# Patient Record
Sex: MALE | Race: BLACK OR AFRICAN AMERICAN | NOT HISPANIC OR LATINO | Employment: OTHER | URBAN - METROPOLITAN AREA
[De-identification: names, ages, dates, MRNs, and addresses within clinical notes are randomized per-mention and may not be internally consistent; named-entity substitution may affect disease eponyms.]

---

## 2017-01-06 ENCOUNTER — PATIENT MESSAGE (OUTPATIENT)
Dept: PULMONOLOGY | Facility: CLINIC | Age: 72
End: 2017-01-06

## 2017-01-09 ENCOUNTER — TELEPHONE (OUTPATIENT)
Dept: PULMONOLOGY | Facility: CLINIC | Age: 72
End: 2017-01-09

## 2017-01-09 DIAGNOSIS — J44.9 CHRONIC OBSTRUCTIVE PULMONARY DISEASE, UNSPECIFIED COPD TYPE: Primary | ICD-10-CM

## 2017-01-09 NOTE — TELEPHONE ENCOUNTER
----- Message from Deanna Viramontes sent at 1/6/2017  9:50 AM CST -----  Nellie with [employer] at [phone]////pt is requesting a nebulizer kit//is needing a medical necessary form//along with clinical notes and prescription//fax is [phone]//please call if any questions//kiara/melodie

## 2017-01-23 ENCOUNTER — TELEPHONE (OUTPATIENT)
Dept: PULMONOLOGY | Facility: CLINIC | Age: 72
End: 2017-01-23

## 2017-01-23 NOTE — TELEPHONE ENCOUNTER
Hospital follow up scheduled after antibiotics are completed for pneumonia. Wife agrees with date and time.

## 2017-01-23 NOTE — TELEPHONE ENCOUNTER
----- Message from Richard Hair sent at 1/23/2017  8:35 AM CST -----  Contact: pt's spouse  She's calling in regards to the pt being hospitalized from 1/19/17 to 1/22/17, pt would like to be worked into the dr's schedule for a hospital f/u, please advise, [phone] (home) [phone] (work)

## 2017-02-04 RX ORDER — ALBUTEROL SULFATE 90 UG/1
AEROSOL, METERED RESPIRATORY (INHALATION)
Qty: 8.5 INHALER | Refills: 11 | Status: SHIPPED | OUTPATIENT
Start: 2017-02-04

## 2017-02-08 ENCOUNTER — OFFICE VISIT (OUTPATIENT)
Dept: PULMONOLOGY | Facility: CLINIC | Age: 72
End: 2017-02-08
Payer: MEDICARE

## 2017-02-08 VITALS
HEART RATE: 108 BPM | DIASTOLIC BLOOD PRESSURE: 60 MMHG | BODY MASS INDEX: 23.57 KG/M2 | HEIGHT: 74 IN | SYSTOLIC BLOOD PRESSURE: 110 MMHG | OXYGEN SATURATION: 90 % | RESPIRATION RATE: 20 BRPM | WEIGHT: 183.63 LBS

## 2017-02-08 DIAGNOSIS — J96.11 CHRONIC RESPIRATORY FAILURE WITH HYPOXIA: Chronic | ICD-10-CM

## 2017-02-08 DIAGNOSIS — J61 ASBESTOSIS: Chronic | ICD-10-CM

## 2017-02-08 DIAGNOSIS — I27.20 PULMONARY HYPERTENSION: Primary | Chronic | ICD-10-CM

## 2017-02-08 DIAGNOSIS — J44.9 COPD, VERY SEVERE: Chronic | ICD-10-CM

## 2017-02-08 DIAGNOSIS — I50.810 RIGHT HEART FAILURE, NYHA CLASS 2: Chronic | ICD-10-CM

## 2017-02-08 PROCEDURE — 1160F RVW MEDS BY RX/DR IN RCRD: CPT | Mod: S$GLB,,, | Performed by: INTERNAL MEDICINE

## 2017-02-08 PROCEDURE — 99999 PR PBB SHADOW E&M-EST. PATIENT-LVL III: CPT | Mod: PBBFAC,,, | Performed by: INTERNAL MEDICINE

## 2017-02-08 PROCEDURE — 99215 OFFICE O/P EST HI 40 MIN: CPT | Mod: 25,S$GLB,, | Performed by: INTERNAL MEDICINE

## 2017-02-08 PROCEDURE — 1159F MED LIST DOCD IN RCRD: CPT | Mod: S$GLB,,, | Performed by: INTERNAL MEDICINE

## 2017-02-08 PROCEDURE — 1157F ADVNC CARE PLAN IN RCRD: CPT | Mod: S$GLB,,, | Performed by: INTERNAL MEDICINE

## 2017-02-08 PROCEDURE — 96372 THER/PROPH/DIAG INJ SC/IM: CPT | Mod: S$GLB,,, | Performed by: INTERNAL MEDICINE

## 2017-02-08 RX ORDER — AMBRISENTAN 5 MG/1
10 TABLET, FILM COATED ORAL DAILY
Qty: 60 TABLET | Refills: 11 | Status: SHIPPED | OUTPATIENT
Start: 2017-02-08

## 2017-02-08 RX ORDER — DIGOXIN 125 MCG
125 TABLET ORAL DAILY
Qty: 30 TABLET | Refills: 11 | Status: SHIPPED | OUTPATIENT
Start: 2017-02-08 | End: 2018-02-08

## 2017-02-08 RX ORDER — PREDNISONE 10 MG/1
10 TABLET ORAL DAILY
Qty: 60 TABLET | Refills: 0 | Status: SHIPPED | OUTPATIENT
Start: 2017-02-08 | End: 2017-02-18

## 2017-02-08 RX ORDER — TRIAMCINOLONE ACETONIDE 40 MG/ML
80 INJECTION, SUSPENSION INTRA-ARTICULAR; INTRAMUSCULAR
Status: COMPLETED | OUTPATIENT
Start: 2017-02-08 | End: 2017-02-08

## 2017-02-08 RX ORDER — CARVEDILOL 12.5 MG/1
12.5 TABLET ORAL 2 TIMES DAILY
Qty: 60 TABLET | Refills: 11 | Status: SHIPPED | OUTPATIENT
Start: 2017-02-08

## 2017-02-08 RX ADMIN — TRIAMCINOLONE ACETONIDE 80 MG: 40 INJECTION, SUSPENSION INTRA-ARTICULAR; INTRAMUSCULAR at 09:02

## 2017-02-08 NOTE — PATIENT INSTRUCTIONS
Taking Digoxin  Digoxin helps slow your heartbeat. It also helps your heart beat stronger. Doctors usually use this treatment in heart failure or in atrial fibrillation. There may be other reasons your doctor prescribes digoxin. Take your medication as your doctor directs. Use the tips below as reminders.  Guidelines for taking digoxin  · Take digoxin once a day. Try to take it at the same time each day.  · Check your pulse before you take your digoxin. If your pulse is under 60 beats per minute, wait 5 minutes. Then check your pulse again. If its still under 60, call your healthcare provider. If your pulse is normal, take your digoxin.  · Do not stop taking digoxin unless your healthcare provider tells you to. This could cause serious problems.  · Review all prescription medications, including over-the-counter medications, vitamins, and supplements with your doctor to ensure they do not interact with digoxin.  · If you have a history of kidney problems, you may not be a good candidate for digoxin. Be sure to discuss this with your doctor.  If you miss a dose  · If no more than 12 hours have passed from the time you usually take your digoxin, take it as soon as you remember. The next day, take it at the usual time.  · If more than 12 hours have passed from the time you usually take your digoxin, dont take that dose. The next day, take it at the usual time.  · Never take more than one dose of digoxin at a time.  How to take your pulse    · Place your first two fingers on the inside of your wrist, below the base of the thumb. Press lightly.  · Count the number of beats for 1 full minute. A normal pulse is between 60 and 100 beats per minute.  When to call your healthcare provider  Call your healthcare provider before you take your next dose of digoxin if you have any of these symptoms:  · Nausea, vomiting, diarrhea, or loss of appetite  · If you see light or halos around objects  · Blurred or yellowed  vision  · Extreme fatigue  · If your pulse becomes fast, irregular, or a pulse below 60 beats per minute  · Chest pain, or pain that goes to the shoulder, neck, or jaw  · Bloody or black stools  · Drowsiness or confusion  · Trouble breathing  · Blurred or double vision, or you see green or yellow halos around lights  · Swelling in your feet or ankles  · Sudden weight gain  Date Last Reviewed: 6/1/2016  © 6690-9248 [employer]. [address] [place] [address]. All rights reserved. This information is not intended as a substitute for professional medical care. Always follow your healthcare professional's instructions.

## 2017-02-08 NOTE — PROGRESS NOTES
Subjective:      Patient ID: Joaquin Crowley is a 71 y.o. male.    Patient Active Problem List   Diagnosis    Asbestosis    Silicosis    Chronic respiratory failure with hypoxia    Pulmonary hypertension    Esophageal reflux    Interatrial cardiac shunt    Right heart failure, NYHA class 2    COPD, very severe    Inadequate sleep hygiene     Problem list has been reviewed.    Chief Complaint: Hospital Follow Up    HPI  He was admitted at [facility] for Pneumonia 2 weeks ago. He was there for 4 days. He was treated with IV antibiotics. He reports that he has not really improved since discharge. He reports that he is weak and has progressive dyspnea with exertion. He ha been back to the ED at least once every weeks. He reports that on subsequent visits to the ED he is given nebulized bronchodilators, ZPAK and steroids and discharged home. His CAT score today is 32. His current respiratory therapy regimen is ACCUNEB, PROAIIR, SYMBICORT and SPIRIVA  which provides some relief. He is adherent with his regimen.        Previous Report Reviewed: office notes     The following portions of the patient's history were reviewed and updated as appropriate: He  has a past medical history of Asbestosis(501); Asthma; Bronchitis chronic; COPD (chronic obstructive pulmonary disease); Coronary artery disease; Dyspnea; Hypertension; Lung disease; Pulmonary HTN; and Silicosis.  He  has a past surgical history that includes Appendectomy; Cholecystectomy; Cardiac catheterization; and Coronary artery bypass graft.  His family history includes Diabetes in his mother; Hypertension in his father.  He  reports that he has never smoked. He does not have any smokeless tobacco history on file. He reports that he does not drink alcohol or use illicit drugs.  He has a current medication list which includes the following prescription(s): carvedilol, albuterol, ambrisentan, amlodipine, aspirin, bumetanide, digoxin, finasteride, latanoprost, nexium,  "prednisone, proair hfa, saw palmetto, solifenacin, and umeclidinium-vilanterol, and the following Facility-Administered Medications: triamcinolone acetonide.  He is allergic to iodine and iodide containing products..    Review of Systems   Constitutional: Positive for night sweats. Negative for chills and fatigue.   HENT: Positive for rhinorrhea and congestion. Negative for nosebleeds, sore throat and hearing loss.    Eyes: Negative for itching.   Respiratory: Positive for cough, chest tightness, wheezing and dyspnea on extertion. Negative for apnea and snoring.    Cardiovascular: Positive for leg swelling. Negative for chest pain and palpitations.   Genitourinary: Negative for difficulty urinating.   Endocrine: Negative for cold intolerance and heat intolerance.    Musculoskeletal: Negative for arthralgias and back pain.   Gastrointestinal: Negative for abdominal pain and acid reflux.   Neurological: Positive for headaches. Negative for dizziness.   Hematological: No excessive bruising.   Psychiatric/Behavioral: Negative for confusion. The patient is not nervous/anxious.       Objective:     Visit Vitals    /60    Pulse 108    Resp 20    Ht 6' 2" (1.88 m)    Wt 83.3 kg (183 lb 10.3 oz)    SpO2 (!) 90%    BMI 23.58 kg/m2     Body mass index is 23.58 kg/(m^2).    Physical Exam   Constitutional: He is oriented to person, place, and time. He appears well-developed and well-nourished.   HENT:   Head: Normocephalic and atraumatic.   Eyes: EOM are normal. Pupils are equal, round, and reactive to light.   Neck: Normal range of motion. Neck supple.   Cardiovascular: Normal rate and regular rhythm.    Pulmonary/Chest: Effort normal. He has decreased breath sounds in the right middle field, the right lower field, the left middle field and the left lower field.   Abdominal: Soft. Bowel sounds are normal.   Musculoskeletal: Normal range of motion.   Neurological: He is alert and oriented to person, place, and time. "   Skin: Skin is warm and dry.   Psychiatric: He has a normal mood and affect. His behavior is normal.   Nursing note and vitals reviewed.      Personal Diagnostic Review  none pertinent    Assessment:     1. Pulmonary hypertension Active   2. Right heart failure, NYHA class 2 Active   3. COPD, very severe Active   4. Chronic respiratory failure with hypoxia Stable   5. Asbestosis Stable     Outpatient Encounter Prescriptions as of 2/8/2017   Medication Sig Dispense Refill    carvedilol (COREG) 12.5 MG tablet Take 1 tablet (12.5 mg total) by mouth 2 (two) times daily. 60 tablet 11    [DISCONTINUED] carvedilol (COREG) 12.5 MG tablet Take 12.5 mg by mouth 2 (two) times daily.      albuterol (ACCUNEB) 0.63 mg/3 mL Nebu Take 3 mLs (0.63 mg total) by nebulization every 6 (six) hours as needed. 9 mL 3    ambrisentan (LETAIRIS) 5 MG Tab Take 2 tablets (10 mg total) by mouth once daily. 60 tablet 11    amlodipine (NORVASC) 5 MG tablet Take 5 mg by mouth once daily.      aspirin (ECOTRIN) 325 MG EC tablet Take 325 mg by mouth once daily.      bumetanide (BUMEX) 1 MG tablet Take 2 tablets (2 mg total) by mouth once daily. 30 tablet 11    digoxin (LANOXIN) 125 mcg tablet Take 1 tablet (125 mcg total) by mouth once daily. 30 tablet 11    finasteride (PROSCAR) 5 mg tablet Take 5 mg by mouth once daily.      latanoprost 0.005 % ophthalmic solution       NEXIUM 40 mg capsule TAKE 1 CAPSULE BY MOUTH EVERY DAY BEFORE BREAKFAST 30 capsule 11    predniSONE (DELTASONE) 10 MG tablet Take 1 tablet (10 mg total) by mouth once daily. 40 mg PO QD X 3 days then 30 mg PO QD x 3 days then 20 mg PO QD X 3 days then 10 mg PO QD X 3 days then 5 mg PO QD x 3 days then stop. 60 tablet 0    PROAIR HFA 90 mcg/actuation inhaler INHALE 2 PUFFS BY MOUTH EVERY 4 HOURS AS NEEDED FOR WHEEZING 8.5 Inhaler 11    saw palmetto 80 MG capsule Take 80 mg by mouth 2 (two) times daily.      solifenacin (VESICARE) 5 MG tablet Take 10 mg by mouth once  "daily.      umeclidinium-vilanterol (ANORO ELLIPTA) 62.5-25 mcg/actuation DsDv Inhale 1 puff into the lungs once daily. 30 each 11    [DISCONTINUED] budesonide-formoterol 160-4.5 mcg (SYMBICORT) 160-4.5 mcg/actuation HFAA Inhale 2 puffs into the lungs every 12 (twelve) hours. 10.2 g 11    [DISCONTINUED] doxycycline (VIBRAMYCIN) 100 MG Cap   0    [DISCONTINUED] LETAIRIS 5 mg Tab TAKE 1 TABLET (5 MG) ORALLY DAILY. DO NOT HANDLE IF PREGNANT. 30 tablet 11    [DISCONTINUED] levoFLOXacin (LEVAQUIN) 250 MG tablet TK 1 T PO QD FOR 5 DAYS  0    [DISCONTINUED] tadalafil (CIALIS) 10 MG tablet Take 2 tablets (20 mg total) by mouth once daily. 60 tablet 11    [DISCONTINUED] tiotropium (SPIRIVA WITH HANDIHALER) 18 mcg inhalation capsule Inhale 1 capsule (18 mcg total) into the lungs once daily. 30 capsule 11     Facility-Administered Encounter Medications as of 2/8/2017   Medication Dose Route Frequency Provider Last Rate Last Dose    triamcinolone acetonide injection 80 mg  80 mg Intramuscular 1 time in Clinic/HOD Arcadio Grey MD         Orders Placed This Encounter   Procedures    NEBULIZER FOR HOME USE     Order Specific Question:   Height:     Answer:   6' 2" (1.88 m)     Order Specific Question:   Weight:     Answer:   83.3 kg (183 lb 10.3 oz)     Order Specific Question:   Length of need (1-99 months):     Answer:   99    CT Chest With Contrast     Standing Status:   Future     Standing Expiration Date:   2/8/2018     Order Specific Question:   Reason for Exam:     Answer:   Asbestosis     Order Specific Question:   Is the patient allergic to iodine or contrast? Has a steroid / antihistamine prep been administered?     Answer:   No     Order Specific Question:   Is the patient on ANY Metformin drug such as Glugophage/Glucovance?           Should be off drug 48 hours after contrast. Check renal function before restart.     Answer:   No     Order Specific Question:   Age > 60 years?     Answer:   Yes     Order " Specific Question:   History of Kidney Disease - including: decreased kidney function, dialysis, kidney transplay, single kidney, kidney cancer, kidney surgery?     Answer:   None     Order Specific Question:   Does the patient have high blood preasure requiring medical treatment?     Answer:   No     Order Specific Question:   Diabetes?     Answer:   No     Order Specific Question:   May the Radiologist modify the order per protocol to meet the clinical needs of the patient?     Answer:   Yes     Order Specific Question:   Recist criteria?     Answer:   No     Order Specific Question:   Will this service be billed to a Worker's Comp policy?     Answer:   No     Plan:     Discussed diagnosis, its evaluation, treatment and usual course. All questions answered.     Chronic respiratory failure with hypoxia  Continue oxygen supplementation at 4 - 5 L /min continuous    COPD, very severe  COPD ROS: taking medications as instructed, no medication side effects noted, notes increased dyspnea, uses oxygen at night, uses oxygen throughout the day, no swelling of ankles, no chest pain.   New concerns: Notes increasing dyspnae with exertion.  Exam: chest clear to auscultation, no wheezes, rales or rhonchi, symmetric air entry, decreased air entry noted diffusely.   Assessment:  COPD loss of control due to intercurrent illness.   Plan: Substitute SPIRIVA and SYMBICORT with ANORO. Kenalog 80mg IM X 1. Prednisone taper. NEBULIZER for HOME USE. Orders as documented in EMR.      Pulmonary hypertension  ECHO 05/26/16: PASP 35.49  On Letairis 5 mg PO daily  Insurance continues to deny Cialis.  On Bumex. RHF compensated.  Add Digoxin - slow digitalization  Increase Letairis to 10 mg PO daily.     Right heart failure, NYHA class 2  Contiue Bumex.   Increase Coreg to 12.5 mg PO BID.  Add digoxin  Continue salt and free water restriction.      Asbestosis  Stable and controlled. No changes to current therapeutic plan. Continue all  previously prescribed medications. CT chest in 1 month.     TIME SPENT WITH PATIENT: Time spent: 45 minutes in face to face  discussion concerning diagnosis, prognosis, review of lab and test results, benefits of treatment as well as management of disease, counseling of patient and coordination of care between various health  care providers . Greater than half the time spent was used for coordination of care and counseling of patient.     Return in about 1 month (around 3/8/2017) for Pulmonary Hypertension, COPD, CHF, Asbestos exposure, Chronic Respiratory Failure.

## 2017-02-08 NOTE — ASSESSMENT & PLAN NOTE
COPD ROS: taking medications as instructed, no medication side effects noted, notes increased dyspnea, uses oxygen at night, uses oxygen throughout the day, no swelling of ankles, no chest pain.   New concerns: Notes increasing dyspnae with exertion.  Exam: chest clear to auscultation, no wheezes, rales or rhonchi, symmetric air entry, decreased air entry noted diffusely.   Assessment:  COPD loss of control due to intercurrent illness.   Plan: Substitute SPIRIVA and SYMBICORT with ANORO. Kenalog 80mg IM X 1. Prednisone taper. NEBULIZER for HOME USE. Orders as documented in EMR.

## 2017-02-08 NOTE — ASSESSMENT & PLAN NOTE
Contiue Bumex.   Increase Coreg to 12.5 mg PO BID.  Add digoxin  Continue salt and free water restriction.

## 2017-02-08 NOTE — ASSESSMENT & PLAN NOTE
ECHO 05/26/16: PASP 35.49  On Letairis 5 mg PO daily  Insurance continues to deny Cialis.  On Bumex. RHF compensated.  Add Digoxin - slow digitalization  Increase Letairis to 10 mg PO daily.

## 2017-02-08 NOTE — ASSESSMENT & PLAN NOTE
Stable and controlled. No changes to current therapeutic plan. Continue all previously prescribed medications. CT chest in 1 month.

## 2022-12-02 ENCOUNTER — PATIENT MESSAGE (OUTPATIENT)
Dept: RESEARCH | Facility: HOSPITAL | Age: 77
End: 2022-12-02